# Patient Record
(demographics unavailable — no encounter records)

---

## 2019-08-20 NOTE — RAD
XR Sacroiliac Joints >=3 View



History: M 53.3. Low back pain



Comparison: None.



Findings: Oblique images of the SI joints were performed, for a total of 2 images. There is a partial
 ankylosis of the anterior superior right SI joint. Left SI joint is normal. No erosions or

periostitis. No subcortical cysts.



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



Reported By: Rusty Hernandez 

Electronically Signed:  8/20/2019 4:58 PM

## 2019-08-20 NOTE — RAD
EXAM:  XR Scoliosis Study



DATE:  8/20/2019 12:00 AM



INDICATION:  History of low back pain and scoliosis



COMPARISON:  None.



FINDING:  There are 12 rib-bearing thoracic vertebra. There are 5 lumbar type vertebra. There is 11 d
egrees of dextroscoliosis centered at T6-T7. No congenital vertebral anomaly is evident. Visualized

lungs are clear. Visualized bowel gas pattern appears normal.





IMPRESSION:Mild dextroscoliosis of the thoracic spine.



Reported By: Sam Renae 

Electronically Signed:  8/20/2019 4:56 PM

## 2020-01-31 NOTE — RAD
LUMBAR SPINE SERIES WITH FLEXION AND EXTENSION:

 

Date:  01/31/2020

 

HISTORY:  

Low back pain. 

 

FINDINGS:

Vertebral bodies are normal in height. Small osteophytes are seen at the L4-5 level without any signi
ficant disc narrowing. No abnormal motion is seen in flexion or extension. Pedicles appear intact. 

 

IMPRESSION: 

Minimal arthritic changes of the spine. 

 

 

POS: [facility]